# Patient Record
Sex: FEMALE | Race: WHITE | URBAN - METROPOLITAN AREA
[De-identification: names, ages, dates, MRNs, and addresses within clinical notes are randomized per-mention and may not be internally consistent; named-entity substitution may affect disease eponyms.]

---

## 2020-09-23 ENCOUNTER — EMERGENCY (EMERGENCY)
Facility: HOSPITAL | Age: 65
LOS: 0 days | Discharge: HOME | End: 2020-09-23
Payer: COMMERCIAL

## 2020-09-23 VITALS
OXYGEN SATURATION: 97 % | TEMPERATURE: 98 F | SYSTOLIC BLOOD PRESSURE: 160 MMHG | DIASTOLIC BLOOD PRESSURE: 91 MMHG | RESPIRATION RATE: 16 BRPM | HEART RATE: 78 BPM

## 2020-09-23 DIAGNOSIS — Z20.828 CONTACT WITH AND (SUSPECTED) EXPOSURE TO OTHER VIRAL COMMUNICABLE DISEASES: ICD-10-CM

## 2020-09-23 LAB — SARS-COV-2 RNA SPEC QL NAA+PROBE: SIGNIFICANT CHANGE UP

## 2020-09-23 PROCEDURE — 99283 EMERGENCY DEPT VISIT LOW MDM: CPT

## 2020-09-23 PROCEDURE — U0003: CPT

## 2020-09-23 NOTE — ED STATDOCS - PATIENT PORTAL LINK FT
You can access the FollowMyHealth Patient Portal offered by [facility] by registering at the following website: [email or web address]. By joining [facility]’s FollowMyHealth portal, you will also be able to view your health information using other applications (apps) compatible with our system.

## 2020-09-23 NOTE — ED STATDOCS - OBJECTIVE STATEMENT
64 y/o Female with no PMHx presents to the ED with concern for Covid 19 after travel.  Pt denies symptoms including cough, fever, SOB, sore throat,runny nose, bodyaches, nausea, diarrhea, loss of smell. Pt here for testing.

## 2025-08-21 ENCOUNTER — OFFICE (OUTPATIENT)
Dept: URBAN - METROPOLITAN AREA CLINIC 102 | Facility: CLINIC | Age: 70
Setting detail: OPHTHALMOLOGY
End: 2025-08-21
Payer: MEDICARE

## 2025-08-21 ENCOUNTER — RX ONLY (RX ONLY)
Age: 70
End: 2025-08-21

## 2025-08-21 DIAGNOSIS — H01.004: ICD-10-CM

## 2025-08-21 DIAGNOSIS — H01.002: ICD-10-CM

## 2025-08-21 DIAGNOSIS — H25.13: ICD-10-CM

## 2025-08-21 DIAGNOSIS — H01.005: ICD-10-CM

## 2025-08-21 DIAGNOSIS — H01.001: ICD-10-CM

## 2025-08-21 PROCEDURE — 92004 COMPRE OPH EXAM NEW PT 1/>: CPT | Performed by: OPHTHALMOLOGY

## 2025-08-21 PROCEDURE — 92020 GONIOSCOPY: CPT | Performed by: OPHTHALMOLOGY

## 2025-08-21 ASSESSMENT — REFRACTION_AUTOREFRACTION
OD_SPHERE: +3.75
OD_AXIS: 114
OS_CYLINDER: -0.25
OD_CYLINDER: -0.25
OS_SPHERE: +4.00
OS_AXIS: 064

## 2025-08-21 ASSESSMENT — REFRACTION_CURRENTRX
OD_OVR_VA: 20/
OS_CYLINDER: 0.00
OS_SPHERE: +2.75
OS_AXIS: 180
OD_CYLINDER: 0.00
OD_ADD: +2.00
OS_OVR_VA: 20/
OD_AXIS: 180
OS_ADD: +2.00
OS_VPRISM_DIRECTION: TRF
OD_VPRISM_DIRECTION: TRF
OD_SPHERE: +2.75

## 2025-08-21 ASSESSMENT — KERATOMETRY
OD_AXISANGLE_DEGREES: 085
OD_K1POWER_DIOPTERS: 43.25
METHOD_AUTO_MANUAL: AUTO
OS_K1POWER_DIOPTERS: 43.50
OS_K2POWER_DIOPTERS: 44.00
OS_AXISANGLE_DEGREES: 100
OD_K2POWER_DIOPTERS: 44.50

## 2025-08-21 ASSESSMENT — CONFRONTATIONAL VISUAL FIELD TEST (CVF)
OS_FINDINGS: FULL
OD_FINDINGS: FULL

## 2025-08-21 ASSESSMENT — VISUAL ACUITY
OD_BCVA: 20/20-1
OS_BCVA: 20/30

## 2025-08-21 ASSESSMENT — TONOMETRY
OS_IOP_MMHG: 15
OD_IOP_MMHG: 17
OD_IOP_MMHG: 18
OS_IOP_MMHG: 18

## 2025-08-21 ASSESSMENT — LID EXAM ASSESSMENTS
OS_BLEPHARITIS: LLL LUL 1+
OD_BLEPHARITIS: RLL RUL 1+